# Patient Record
Sex: FEMALE | Race: WHITE | NOT HISPANIC OR LATINO | Employment: FULL TIME | URBAN - METROPOLITAN AREA
[De-identification: names, ages, dates, MRNs, and addresses within clinical notes are randomized per-mention and may not be internally consistent; named-entity substitution may affect disease eponyms.]

---

## 2019-07-29 RX ORDER — FLUTICASONE PROPIONATE 50 MCG
2 SPRAY, SUSPENSION (ML) NASAL DAILY
Qty: 15.8 ML | Refills: 16 | Status: SHIPPED | OUTPATIENT
Start: 2019-07-29

## 2019-07-29 RX ORDER — VENLAFAXINE HYDROCHLORIDE 75 MG/1
75 CAPSULE, EXTENDED RELEASE ORAL DAILY
Qty: 30 CAPSULE | Refills: 1 | Status: SHIPPED | OUTPATIENT
Start: 2019-07-29

## 2019-07-29 RX ORDER — HYDROCHLOROTHIAZIDE 25 MG/1
25 TABLET ORAL DAILY
Qty: 90 TABLET | Refills: 0 | Status: SHIPPED | OUTPATIENT
Start: 2019-07-29

## 2021-08-09 ENCOUNTER — APPOINTMENT (OUTPATIENT)
Dept: GENERAL RADIOLOGY | Facility: HOSPITAL | Age: 56
End: 2021-08-09

## 2021-08-09 ENCOUNTER — APPOINTMENT (OUTPATIENT)
Dept: CT IMAGING | Facility: HOSPITAL | Age: 56
End: 2021-08-09

## 2021-08-09 ENCOUNTER — HOSPITAL ENCOUNTER (INPATIENT)
Facility: HOSPITAL | Age: 56
LOS: 2 days | Discharge: HOME | End: 2021-08-11
Attending: EMERGENCY MEDICINE | Admitting: INTERNAL MEDICINE

## 2021-08-09 DIAGNOSIS — Z99.11 ON MECHANICALLY ASSISTED VENTILATION (HCC): ICD-10-CM

## 2021-08-09 DIAGNOSIS — J96.02 ACUTE RESPIRATORY FAILURE WITH HYPERCAPNIA (HCC): ICD-10-CM

## 2021-08-09 DIAGNOSIS — F10.929 ALCOHOLIC INTOXICATION WITH COMPLICATION (HCC): Primary | ICD-10-CM

## 2021-08-09 LAB
ALBUMIN SERPL-MCNC: 4.7 G/DL (ref 3.5–5.2)
ALBUMIN/GLOB SERPL: 1.2 G/DL
ALP SERPL-CCNC: 111 U/L (ref 39–117)
ALT SERPL W P-5'-P-CCNC: 21 U/L (ref 1–33)
AMPHET+METHAMPHET UR QL: NEGATIVE
ANION GAP SERPL CALCULATED.3IONS-SCNC: 14.5 MMOL/L (ref 5–15)
APAP SERPL-MCNC: <5 MCG/ML (ref 0–30)
ARTERIAL PATENCY WRIST A: POSITIVE
AST SERPL-CCNC: 26 U/L (ref 1–32)
ATMOSPHERIC PRESS: 750.5 MMHG
BARBITURATES UR QL SCN: NEGATIVE
BASE EXCESS BLDA CALC-SCNC: -2.9 MMOL/L (ref 0–2)
BDY SITE: ABNORMAL
BENZODIAZ UR QL SCN: NEGATIVE
BILIRUB SERPL-MCNC: <0.2 MG/DL (ref 0–1.2)
BILIRUB UR QL STRIP: NEGATIVE
BUN SERPL-MCNC: 15 MG/DL (ref 6–20)
BUN/CREAT SERPL: 18.5 (ref 7–25)
CALCIUM SPEC-SCNC: 9.4 MG/DL (ref 8.6–10.5)
CANNABINOIDS SERPL QL: NEGATIVE
CHLORIDE SERPL-SCNC: 100 MMOL/L (ref 98–107)
CLARITY UR: CLEAR
CO2 SERPL-SCNC: 24.5 MMOL/L (ref 22–29)
COCAINE UR QL: NEGATIVE
COLOR UR: YELLOW
CREAT SERPL-MCNC: 0.81 MG/DL (ref 0.57–1)
DEPRECATED RDW RBC AUTO: 43.8 FL (ref 37–54)
EOSINOPHIL # BLD MANUAL: 0.44 10*3/MM3 (ref 0–0.4)
EOSINOPHIL NFR BLD MANUAL: 4 % (ref 0.3–6.2)
ERYTHROCYTE [DISTWIDTH] IN BLOOD BY AUTOMATED COUNT: 13 % (ref 12.3–15.4)
ETHANOL BLD-MCNC: 202 MG/DL (ref 0–10)
ETHANOL UR QL: 0.2 %
GFR SERPL CREATININE-BSD FRML MDRD: 73 ML/MIN/1.73
GFR SERPL CREATININE-BSD FRML MDRD: 89 ML/MIN/1.73
GLOBULIN UR ELPH-MCNC: 3.9 GM/DL
GLUCOSE BLDC GLUCOMTR-MCNC: 103 MG/DL (ref 70–130)
GLUCOSE SERPL-MCNC: 110 MG/DL (ref 65–99)
GLUCOSE UR STRIP-MCNC: NEGATIVE MG/DL
HCO3 BLDA-SCNC: 25.7 MMOL/L (ref 22–28)
HCT VFR BLD AUTO: 38.8 % (ref 34–46.6)
HGB BLD-MCNC: 13.1 G/DL (ref 12–15.9)
HGB UR QL STRIP.AUTO: NEGATIVE
HOLD SPECIMEN: NORMAL
HOLD SPECIMEN: NORMAL
INHALED O2 CONCENTRATION: 60 %
KETONES UR QL STRIP: NEGATIVE
LEUKOCYTE ESTERASE UR QL STRIP.AUTO: NEGATIVE
LYMPHOCYTES # BLD MANUAL: 5.7 10*3/MM3 (ref 0.7–3.1)
LYMPHOCYTES NFR BLD MANUAL: 52 % (ref 19.6–45.3)
LYMPHOCYTES NFR BLD MANUAL: 7 % (ref 5–12)
MAGNESIUM SERPL-MCNC: 2.1 MG/DL (ref 1.6–2.6)
MCH RBC QN AUTO: 31.8 PG (ref 26.6–33)
MCHC RBC AUTO-ENTMCNC: 33.8 G/DL (ref 31.5–35.7)
MCV RBC AUTO: 94.2 FL (ref 79–97)
METHADONE UR QL SCN: NEGATIVE
MODALITY: ABNORMAL
MONOCYTES # BLD AUTO: 0.77 10*3/MM3 (ref 0.1–0.9)
NEUTROPHILS # BLD AUTO: 4.06 10*3/MM3 (ref 1.7–7)
NEUTROPHILS NFR BLD MANUAL: 37 % (ref 42.7–76)
NITRITE UR QL STRIP: NEGATIVE
NRBC BLD AUTO-RTO: 0 /100 WBC (ref 0–0.2)
O2 A-A PPRESDIFF RESPIRATORY: 0.5 MMHG
OPIATES UR QL: NEGATIVE
OXYCODONE UR QL SCN: NEGATIVE
PCO2 BLDA: 61.2 MM HG (ref 35–45)
PEEP RESPIRATORY: 5 CM[H2O]
PH BLDA: 7.23 PH UNITS (ref 7.35–7.45)
PH UR STRIP.AUTO: 5.5 [PH] (ref 5–8)
PLAT MORPH BLD: NORMAL
PLATELET # BLD AUTO: 309 10*3/MM3 (ref 140–450)
PMV BLD AUTO: 11.7 FL (ref 6–12)
PO2 BLDA: 199.9 MM HG (ref 80–100)
POTASSIUM SERPL-SCNC: 3.8 MMOL/L (ref 3.5–5.2)
PROT SERPL-MCNC: 8.6 G/DL (ref 6–8.5)
PROT UR QL STRIP: NEGATIVE
RBC # BLD AUTO: 4.12 10*6/MM3 (ref 3.77–5.28)
RBC MORPH BLD: NORMAL
SALICYLATES SERPL-MCNC: <0.3 MG/DL
SAO2 % BLDCOA: 99.5 % (ref 92–99)
SET MECH RESP RATE: 16
SODIUM SERPL-SCNC: 139 MMOL/L (ref 136–145)
SP GR UR STRIP: 1.01 (ref 1–1.03)
TOTAL RATE: 16 BREATHS/MINUTE
UROBILINOGEN UR QL STRIP: NORMAL
VENTILATOR MODE: ABNORMAL
VT ON VENT VENT: 0.5 ML
WBC # BLD AUTO: 10.97 10*3/MM3 (ref 3.4–10.8)
WBC MORPH BLD: NORMAL
WHOLE BLOOD HOLD SPECIMEN: NORMAL

## 2021-08-09 PROCEDURE — P9612 CATHETERIZE FOR URINE SPEC: HCPCS

## 2021-08-09 PROCEDURE — U0004 COV-19 TEST NON-CDC HGH THRU: HCPCS | Performed by: EMERGENCY MEDICINE

## 2021-08-09 PROCEDURE — 80307 DRUG TEST PRSMV CHEM ANLYZR: CPT | Performed by: EMERGENCY MEDICINE

## 2021-08-09 PROCEDURE — 82803 BLOOD GASES ANY COMBINATION: CPT

## 2021-08-09 PROCEDURE — 70450 CT HEAD/BRAIN W/O DYE: CPT

## 2021-08-09 PROCEDURE — 80179 DRUG ASSAY SALICYLATE: CPT | Performed by: EMERGENCY MEDICINE

## 2021-08-09 PROCEDURE — 5A1935Z RESPIRATORY VENTILATION, LESS THAN 24 CONSECUTIVE HOURS: ICD-10-PCS | Performed by: EMERGENCY MEDICINE

## 2021-08-09 PROCEDURE — 99285 EMERGENCY DEPT VISIT HI MDM: CPT

## 2021-08-09 PROCEDURE — 81003 URINALYSIS AUTO W/O SCOPE: CPT | Performed by: EMERGENCY MEDICINE

## 2021-08-09 PROCEDURE — 94799 UNLISTED PULMONARY SVC/PX: CPT

## 2021-08-09 PROCEDURE — 94002 VENT MGMT INPAT INIT DAY: CPT

## 2021-08-09 PROCEDURE — 25010000002 PROPOFOL 10 MG/ML EMULSION

## 2021-08-09 PROCEDURE — 82962 GLUCOSE BLOOD TEST: CPT

## 2021-08-09 PROCEDURE — 93005 ELECTROCARDIOGRAM TRACING: CPT | Performed by: INTERNAL MEDICINE

## 2021-08-09 PROCEDURE — 71045 X-RAY EXAM CHEST 1 VIEW: CPT

## 2021-08-09 PROCEDURE — 85025 COMPLETE CBC W/AUTO DIFF WBC: CPT | Performed by: EMERGENCY MEDICINE

## 2021-08-09 PROCEDURE — 31500 INSERT EMERGENCY AIRWAY: CPT

## 2021-08-09 PROCEDURE — 85007 BL SMEAR W/DIFF WBC COUNT: CPT | Performed by: EMERGENCY MEDICINE

## 2021-08-09 PROCEDURE — 36600 WITHDRAWAL OF ARTERIAL BLOOD: CPT

## 2021-08-09 PROCEDURE — 84484 ASSAY OF TROPONIN QUANT: CPT | Performed by: INTERNAL MEDICINE

## 2021-08-09 PROCEDURE — 82077 ASSAY SPEC XCP UR&BREATH IA: CPT | Performed by: EMERGENCY MEDICINE

## 2021-08-09 PROCEDURE — 80053 COMPREHEN METABOLIC PANEL: CPT | Performed by: EMERGENCY MEDICINE

## 2021-08-09 PROCEDURE — 83735 ASSAY OF MAGNESIUM: CPT | Performed by: EMERGENCY MEDICINE

## 2021-08-09 PROCEDURE — 80143 DRUG ASSAY ACETAMINOPHEN: CPT | Performed by: EMERGENCY MEDICINE

## 2021-08-09 PROCEDURE — 0BH17EZ INSERTION OF ENDOTRACHEAL AIRWAY INTO TRACHEA, VIA NATURAL OR ARTIFICIAL OPENING: ICD-10-PCS | Performed by: EMERGENCY MEDICINE

## 2021-08-09 PROCEDURE — 25010000002 SUCCINYLCHOLINE PER 20 MG: Performed by: EMERGENCY MEDICINE

## 2021-08-09 PROCEDURE — 25010000002 FENTANYL CITRATE (PF) 50 MCG/ML SOLUTION: Performed by: EMERGENCY MEDICINE

## 2021-08-09 PROCEDURE — 93010 ELECTROCARDIOGRAM REPORT: CPT | Performed by: INTERNAL MEDICINE

## 2021-08-09 RX ORDER — PROPOFOL 10 MG/ML
VIAL (ML) INTRAVENOUS
Status: COMPLETED
Start: 2021-08-09 | End: 2021-08-09

## 2021-08-09 RX ORDER — FENTANYL CITRATE 50 UG/ML
50 INJECTION, SOLUTION INTRAMUSCULAR; INTRAVENOUS ONCE
Status: COMPLETED | OUTPATIENT
Start: 2021-08-09 | End: 2021-08-09

## 2021-08-09 RX ORDER — SODIUM CHLORIDE 9 MG/ML
125 INJECTION, SOLUTION INTRAVENOUS CONTINUOUS
Status: DISCONTINUED | OUTPATIENT
Start: 2021-08-09 | End: 2021-08-11

## 2021-08-09 RX ORDER — SUCCINYLCHOLINE CHLORIDE 20 MG/ML
INJECTION INTRAMUSCULAR; INTRAVENOUS
Status: COMPLETED | OUTPATIENT
Start: 2021-08-09 | End: 2021-08-09

## 2021-08-09 RX ORDER — SODIUM CHLORIDE 0.9 % (FLUSH) 0.9 %
10 SYRINGE (ML) INJECTION AS NEEDED
Status: DISCONTINUED | OUTPATIENT
Start: 2021-08-09 | End: 2021-08-11 | Stop reason: HOSPADM

## 2021-08-09 RX ORDER — SODIUM CHLORIDE, SODIUM LACTATE, POTASSIUM CHLORIDE, CALCIUM CHLORIDE 600; 310; 30; 20 MG/100ML; MG/100ML; MG/100ML; MG/100ML
150 INJECTION, SOLUTION INTRAVENOUS CONTINUOUS
Status: ACTIVE | OUTPATIENT
Start: 2021-08-09 | End: 2021-08-10

## 2021-08-09 RX ORDER — FENTANYL CITRATE 50 UG/ML
100 INJECTION, SOLUTION INTRAMUSCULAR; INTRAVENOUS
Status: DISCONTINUED | OUTPATIENT
Start: 2021-08-09 | End: 2021-08-10

## 2021-08-09 RX ADMIN — FENTANYL CITRATE 50 MCG: 50 INJECTION, SOLUTION INTRAMUSCULAR; INTRAVENOUS at 23:00

## 2021-08-09 RX ADMIN — PROPOFOL 10 MCG/KG/MIN: 10 INJECTION, EMULSION INTRAVENOUS at 21:13

## 2021-08-09 RX ADMIN — SODIUM CHLORIDE 125 ML/HR: 9 INJECTION, SOLUTION INTRAVENOUS at 22:40

## 2021-08-09 RX ADMIN — SODIUM CHLORIDE 1000 ML: 9 INJECTION, SOLUTION INTRAVENOUS at 20:58

## 2021-08-09 RX ADMIN — FENTANYL CITRATE 100 MCG: 50 INJECTION, SOLUTION INTRAMUSCULAR; INTRAVENOUS at 21:21

## 2021-08-09 RX ADMIN — SUCCINYLCHOLINE CHLORIDE 100 MG: 20 INJECTION, SOLUTION INTRAMUSCULAR; INTRAVENOUS; PARENTERAL at 20:59

## 2021-08-09 RX ADMIN — SODIUM CHLORIDE 1000 ML: 9 INJECTION, SOLUTION INTRAVENOUS at 21:04

## 2021-08-10 LAB
ALBUMIN SERPL-MCNC: 3.4 G/DL (ref 3.5–5.2)
ALBUMIN/GLOB SERPL: 1.2 G/DL
ALP SERPL-CCNC: 81 U/L (ref 39–117)
ALT SERPL W P-5'-P-CCNC: 17 U/L (ref 1–33)
ANION GAP SERPL CALCULATED.3IONS-SCNC: 12.4 MMOL/L (ref 5–15)
ARTERIAL PATENCY WRIST A: POSITIVE
ARTERIAL PATENCY WRIST A: POSITIVE
AST SERPL-CCNC: 21 U/L (ref 1–32)
ATMOSPHERIC PRESS: 752.1 MMHG
ATMOSPHERIC PRESS: 752.4 MMHG
BASE EXCESS BLDA CALC-SCNC: -0.7 MMOL/L (ref 0–2)
BASE EXCESS BLDA CALC-SCNC: -2.6 MMOL/L (ref 0–2)
BASOPHILS # BLD AUTO: 0.06 10*3/MM3 (ref 0–0.2)
BASOPHILS NFR BLD AUTO: 0.6 % (ref 0–1.5)
BDY SITE: ABNORMAL
BDY SITE: ABNORMAL
BILIRUB SERPL-MCNC: 0.2 MG/DL (ref 0–1.2)
BUN SERPL-MCNC: 11 MG/DL (ref 6–20)
BUN/CREAT SERPL: 17.5 (ref 7–25)
CALCIUM SPEC-SCNC: 8.3 MG/DL (ref 8.6–10.5)
CHLORIDE SERPL-SCNC: 102 MMOL/L (ref 98–107)
CO2 SERPL-SCNC: 22.6 MMOL/L (ref 22–29)
CREAT SERPL-MCNC: 0.63 MG/DL (ref 0.57–1)
D-LACTATE SERPL-SCNC: 2.3 MMOL/L (ref 0.5–2)
D-LACTATE SERPL-SCNC: 2.9 MMOL/L (ref 0.5–2)
DEPRECATED RDW RBC AUTO: 45.6 FL (ref 37–54)
EOSINOPHIL # BLD AUTO: 0.03 10*3/MM3 (ref 0–0.4)
EOSINOPHIL NFR BLD AUTO: 0.3 % (ref 0.3–6.2)
ERYTHROCYTE [DISTWIDTH] IN BLOOD BY AUTOMATED COUNT: 12.7 % (ref 12.3–15.4)
GFR SERPL CREATININE-BSD FRML MDRD: 98 ML/MIN/1.73
GLOBULIN UR ELPH-MCNC: 2.8 GM/DL
GLUCOSE BLDC GLUCOMTR-MCNC: 115 MG/DL (ref 70–130)
GLUCOSE BLDC GLUCOMTR-MCNC: 94 MG/DL (ref 70–130)
GLUCOSE SERPL-MCNC: 92 MG/DL (ref 65–99)
HCO3 BLDA-SCNC: 23.6 MMOL/L (ref 22–28)
HCO3 BLDA-SCNC: 24.3 MMOL/L (ref 22–28)
HCT VFR BLD AUTO: 35.8 % (ref 34–46.6)
HGB BLD-MCNC: 11.5 G/DL (ref 12–15.9)
IMM GRANULOCYTES # BLD AUTO: 0.04 10*3/MM3 (ref 0–0.05)
IMM GRANULOCYTES NFR BLD AUTO: 0.4 % (ref 0–0.5)
INHALED O2 CONCENTRATION: 25 %
INHALED O2 CONCENTRATION: 25 %
LYMPHOCYTES # BLD AUTO: 2.54 10*3/MM3 (ref 0.7–3.1)
LYMPHOCYTES NFR BLD AUTO: 24.3 % (ref 19.6–45.3)
MCH RBC QN AUTO: 31 PG (ref 26.6–33)
MCHC RBC AUTO-ENTMCNC: 32.1 G/DL (ref 31.5–35.7)
MCV RBC AUTO: 96.5 FL (ref 79–97)
MODALITY: ABNORMAL
MODALITY: ABNORMAL
MONOCYTES # BLD AUTO: 0.76 10*3/MM3 (ref 0.1–0.9)
MONOCYTES NFR BLD AUTO: 7.3 % (ref 5–12)
NEUTROPHILS NFR BLD AUTO: 67.1 % (ref 42.7–76)
NEUTROPHILS NFR BLD AUTO: 7.04 10*3/MM3 (ref 1.7–7)
NRBC BLD AUTO-RTO: 0 /100 WBC (ref 0–0.2)
O2 A-A PPRESDIFF RESPIRATORY: 0.6 MMHG
O2 A-A PPRESDIFF RESPIRATORY: 0.6 MMHG
PCO2 BLDA: 40.3 MM HG (ref 35–45)
PCO2 BLDA: 45.7 MM HG (ref 35–45)
PEEP RESPIRATORY: 5 CM[H2O]
PEEP RESPIRATORY: 5 CM[H2O]
PH BLDA: 7.32 PH UNITS (ref 7.35–7.45)
PH BLDA: 7.39 PH UNITS (ref 7.35–7.45)
PLATELET # BLD AUTO: 260 10*3/MM3 (ref 140–450)
PMV BLD AUTO: 11.9 FL (ref 6–12)
PO2 BLDA: 76.2 MM HG (ref 80–100)
PO2 BLDA: 88.2 MM HG (ref 80–100)
POTASSIUM SERPL-SCNC: 3.3 MMOL/L (ref 3.5–5.2)
PROT SERPL-MCNC: 6.2 G/DL (ref 6–8.5)
PSV: 8 CMH2O
QT INTERVAL: 417 MS
RBC # BLD AUTO: 3.71 10*6/MM3 (ref 3.77–5.28)
SAO2 % BLDCOA: 93.8 % (ref 92–99)
SAO2 % BLDCOA: 96.7 % (ref 92–99)
SARS-COV-2 ORF1AB RESP QL NAA+PROBE: NOT DETECTED
SET MECH RESP RATE: 20
SODIUM SERPL-SCNC: 137 MMOL/L (ref 136–145)
TOTAL RATE: 14 BREATHS/MINUTE
TOTAL RATE: 20 BREATHS/MINUTE
TROPONIN T SERPL-MCNC: <0.01 NG/ML (ref 0–0.03)
VENTILATOR MODE: ABNORMAL
VENTILATOR MODE: ABNORMAL
VT ON VENT VENT: 510 ML
VT ON VENT VENT: 629 ML
WBC # BLD AUTO: 10.47 10*3/MM3 (ref 3.4–10.8)

## 2021-08-10 PROCEDURE — 25010000002 PROPOFOL 10 MG/ML EMULSION: Performed by: INTERNAL MEDICINE

## 2021-08-10 PROCEDURE — 85025 COMPLETE CBC W/AUTO DIFF WBC: CPT | Performed by: INTERNAL MEDICINE

## 2021-08-10 PROCEDURE — 94799 UNLISTED PULMONARY SVC/PX: CPT

## 2021-08-10 PROCEDURE — 82962 GLUCOSE BLOOD TEST: CPT

## 2021-08-10 PROCEDURE — 94003 VENT MGMT INPAT SUBQ DAY: CPT

## 2021-08-10 PROCEDURE — 25010000002 THIAMINE PER 100 MG: Performed by: INTERNAL MEDICINE

## 2021-08-10 PROCEDURE — 83605 ASSAY OF LACTIC ACID: CPT | Performed by: INTERNAL MEDICINE

## 2021-08-10 PROCEDURE — 25010000002 ENOXAPARIN PER 10 MG: Performed by: INTERNAL MEDICINE

## 2021-08-10 PROCEDURE — 25010000002 FENTANYL CITRATE (PF) 50 MCG/ML SOLUTION: Performed by: INTERNAL MEDICINE

## 2021-08-10 PROCEDURE — 80053 COMPREHEN METABOLIC PANEL: CPT | Performed by: INTERNAL MEDICINE

## 2021-08-10 PROCEDURE — 87040 BLOOD CULTURE FOR BACTERIA: CPT | Performed by: INTERNAL MEDICINE

## 2021-08-10 PROCEDURE — 25010000002 PIPERACILLIN SOD-TAZOBACTAM PER 1 G: Performed by: INTERNAL MEDICINE

## 2021-08-10 PROCEDURE — 36600 WITHDRAWAL OF ARTERIAL BLOOD: CPT

## 2021-08-10 PROCEDURE — 82803 BLOOD GASES ANY COMBINATION: CPT

## 2021-08-10 PROCEDURE — 25010000003 POTASSIUM CHLORIDE 10 MEQ/100ML SOLUTION: Performed by: INTERNAL MEDICINE

## 2021-08-10 RX ORDER — POTASSIUM CHLORIDE 750 MG/1
40 TABLET, FILM COATED, EXTENDED RELEASE ORAL AS NEEDED
Status: DISCONTINUED | OUTPATIENT
Start: 2021-08-10 | End: 2021-08-11 | Stop reason: HOSPADM

## 2021-08-10 RX ORDER — ONDANSETRON 2 MG/ML
4 INJECTION INTRAMUSCULAR; INTRAVENOUS EVERY 6 HOURS PRN
Status: DISCONTINUED | OUTPATIENT
Start: 2021-08-10 | End: 2021-08-11 | Stop reason: HOSPADM

## 2021-08-10 RX ORDER — ALUMINA, MAGNESIA, AND SIMETHICONE 2400; 2400; 240 MG/30ML; MG/30ML; MG/30ML
15 SUSPENSION ORAL EVERY 6 HOURS PRN
Status: DISCONTINUED | OUTPATIENT
Start: 2021-08-10 | End: 2021-08-11 | Stop reason: HOSPADM

## 2021-08-10 RX ORDER — ACETAMINOPHEN 650 MG/1
650 SUPPOSITORY RECTAL EVERY 4 HOURS PRN
Status: DISCONTINUED | OUTPATIENT
Start: 2021-08-10 | End: 2021-08-11 | Stop reason: HOSPADM

## 2021-08-10 RX ORDER — SODIUM CHLORIDE 0.9 % (FLUSH) 0.9 %
10 SYRINGE (ML) INJECTION AS NEEDED
Status: DISCONTINUED | OUTPATIENT
Start: 2021-08-10 | End: 2021-08-11 | Stop reason: HOSPADM

## 2021-08-10 RX ORDER — POTASSIUM CHLORIDE 7.45 MG/ML
10 INJECTION INTRAVENOUS
Status: DISCONTINUED | OUTPATIENT
Start: 2021-08-10 | End: 2021-08-11 | Stop reason: HOSPADM

## 2021-08-10 RX ORDER — VENLAFAXINE HYDROCHLORIDE 75 MG/1
75 CAPSULE, EXTENDED RELEASE ORAL DAILY
Status: DISCONTINUED | OUTPATIENT
Start: 2021-08-10 | End: 2021-08-11 | Stop reason: HOSPADM

## 2021-08-10 RX ORDER — POTASSIUM CHLORIDE 1.5 G/1.77G
40 POWDER, FOR SOLUTION ORAL AS NEEDED
Status: DISCONTINUED | OUTPATIENT
Start: 2021-08-10 | End: 2021-08-11 | Stop reason: HOSPADM

## 2021-08-10 RX ORDER — ACETAMINOPHEN 325 MG/1
650 TABLET ORAL EVERY 4 HOURS PRN
Status: DISCONTINUED | OUTPATIENT
Start: 2021-08-10 | End: 2021-08-11 | Stop reason: HOSPADM

## 2021-08-10 RX ORDER — FENTANYL CITRATE 50 UG/ML
50 INJECTION, SOLUTION INTRAMUSCULAR; INTRAVENOUS
Status: DISCONTINUED | OUTPATIENT
Start: 2021-08-10 | End: 2021-08-11

## 2021-08-10 RX ORDER — FENTANYL CITRATE 50 UG/ML
25 INJECTION, SOLUTION INTRAMUSCULAR; INTRAVENOUS EVERY 4 HOURS PRN
Status: DISCONTINUED | OUTPATIENT
Start: 2021-08-10 | End: 2021-08-11

## 2021-08-10 RX ORDER — HYDROCHLOROTHIAZIDE 25 MG/1
25 TABLET ORAL DAILY
Status: DISCONTINUED | OUTPATIENT
Start: 2021-08-10 | End: 2021-08-11 | Stop reason: HOSPADM

## 2021-08-10 RX ORDER — ONDANSETRON 4 MG/1
4 TABLET, FILM COATED ORAL EVERY 6 HOURS PRN
Status: DISCONTINUED | OUTPATIENT
Start: 2021-08-10 | End: 2021-08-11 | Stop reason: HOSPADM

## 2021-08-10 RX ORDER — SODIUM CHLORIDE 0.9 % (FLUSH) 0.9 %
10 SYRINGE (ML) INJECTION EVERY 12 HOURS SCHEDULED
Status: DISCONTINUED | OUTPATIENT
Start: 2021-08-10 | End: 2021-08-11 | Stop reason: HOSPADM

## 2021-08-10 RX ADMIN — TAZOBACTAM SODIUM AND PIPERACILLIN SODIUM 3.38 G: 375; 3 INJECTION, SOLUTION INTRAVENOUS at 16:28

## 2021-08-10 RX ADMIN — TAZOBACTAM SODIUM AND PIPERACILLIN SODIUM 3.38 G: 375; 3 INJECTION, SOLUTION INTRAVENOUS at 02:13

## 2021-08-10 RX ADMIN — SODIUM CHLORIDE, PRESERVATIVE FREE 10 ML: 5 INJECTION INTRAVENOUS at 09:43

## 2021-08-10 RX ADMIN — FENTANYL CITRATE 25 MCG: 50 INJECTION, SOLUTION INTRAMUSCULAR; INTRAVENOUS at 21:02

## 2021-08-10 RX ADMIN — POTASSIUM CHLORIDE 10 MEQ: 7.46 INJECTION, SOLUTION INTRAVENOUS at 11:34

## 2021-08-10 RX ADMIN — PROPOFOL 50 MCG/KG/MIN: 10 INJECTION, EMULSION INTRAVENOUS at 06:07

## 2021-08-10 RX ADMIN — FOLIC ACID 125 ML/HR: 5 INJECTION, SOLUTION INTRAMUSCULAR; INTRAVENOUS; SUBCUTANEOUS at 00:50

## 2021-08-10 RX ADMIN — TAZOBACTAM SODIUM AND PIPERACILLIN SODIUM 3.38 G: 375; 3 INJECTION, SOLUTION INTRAVENOUS at 09:15

## 2021-08-10 RX ADMIN — SODIUM CHLORIDE 125 ML/HR: 9 INJECTION, SOLUTION INTRAVENOUS at 00:04

## 2021-08-10 RX ADMIN — ENOXAPARIN SODIUM 40 MG: 40 INJECTION SUBCUTANEOUS at 06:07

## 2021-08-10 RX ADMIN — SODIUM CHLORIDE 125 ML/HR: 9 INJECTION, SOLUTION INTRAVENOUS at 16:00

## 2021-08-10 RX ADMIN — FENTANYL CITRATE 25 MCG: 50 INJECTION, SOLUTION INTRAMUSCULAR; INTRAVENOUS at 09:52

## 2021-08-10 RX ADMIN — PROPOFOL 50 MCG/KG/MIN: 10 INJECTION, EMULSION INTRAVENOUS at 02:12

## 2021-08-10 RX ADMIN — POTASSIUM CHLORIDE 10 MEQ: 7.46 INJECTION, SOLUTION INTRAVENOUS at 14:14

## 2021-08-10 RX ADMIN — SODIUM CHLORIDE, PRESERVATIVE FREE 10 ML: 5 INJECTION INTRAVENOUS at 21:02

## 2021-08-10 RX ADMIN — SODIUM CHLORIDE, PRESERVATIVE FREE 10 ML: 5 INJECTION INTRAVENOUS at 02:17

## 2021-08-10 RX ADMIN — POTASSIUM CHLORIDE 10 MEQ: 7.46 INJECTION, SOLUTION INTRAVENOUS at 12:47

## 2021-08-10 RX ADMIN — POTASSIUM CHLORIDE 10 MEQ: 7.46 INJECTION, SOLUTION INTRAVENOUS at 09:44

## 2021-08-10 RX ADMIN — SODIUM CHLORIDE, POTASSIUM CHLORIDE, SODIUM LACTATE AND CALCIUM CHLORIDE 150 ML/HR: 600; 310; 30; 20 INJECTION, SOLUTION INTRAVENOUS at 00:08

## 2021-08-10 RX ADMIN — SODIUM CHLORIDE, POTASSIUM CHLORIDE, SODIUM LACTATE AND CALCIUM CHLORIDE 150 ML/HR: 600; 310; 30; 20 INJECTION, SOLUTION INTRAVENOUS at 08:01

## 2021-08-10 NOTE — ED PROVIDER NOTES
EMERGENCY DEPARTMENT ENCOUNTER    Room Number:  13/13  Date of encounter:  8/9/2021  PCP: Provider, No Known  Historian: EMS      HPI:  Chief Complaint: Altered mental status  A complete HPI/ROS/PMH/PSH/SH/FH are unobtainable due to: Altered mental status    Context: Funmi Batres is a 55 y.o. female who presents to the ED via EMS after [place].  The report is that the patient has had excessive alcohol to drink and was essentially unresponsive.  EMS states that in route, she vomited once and has been lethargic and unresponsive to noxious and painful stimuli.  There is no reported trauma.  She is unable to give any history at all.      The patient was placed in a mask in triage, hand hygiene was performed before and after my interaction with the patient.  I wore a mask, safety glasses and gloves during my entire interaction with the patient.    PAST MEDICAL HISTORY  Active Ambulatory Problems     Diagnosis Date Noted   • No Active Ambulatory Problems     Resolved Ambulatory Problems     Diagnosis Date Noted   • No Resolved Ambulatory Problems     Past Medical History:   Diagnosis Date   • Anxiety    • Hypertension          PAST SURGICAL HISTORY  No past surgical history on file.      FAMILY HISTORY  No family history on file.      SOCIAL HISTORY  Social History     Socioeconomic History   • Marital status:      Spouse name: Not on file   • Number of children: Not on file   • Years of education: Not on file   • Highest education level: Not on file         ALLERGIES  Patient has no allergy information on record.        REVIEW OF SYSTEMS  Review of Systems   Unable to perform ROS: Mental status change            PHYSICAL EXAM    I have reviewed the triage vital signs and nursing notes.    ED Triage Vitals [08/09/21 2052]   Temp Heart Rate Resp BP SpO2   -- 74 (!) 6 (!) 114/110 94 %      Temp src Heart Rate Source Patient Position BP Location FiO2 (%)   -- Monitor Lying Left arm --        Physical Exam   Constitutional: Pt. is lethargic.  She does not respond to painful stimuli nor sternal rub.  She does not protect her airway.   HENT: Normocephalic and atraumatic. Oropharynx moist/nonerythematous.  Neck: Normal range of motion. Neck supple. No JVD present.   Cardiovascular: Normal rate, regular rhythm and normal heart sounds. Exam reveals no gallop and no friction rub.   No murmur heard.  Pulmonary/Chest: Effort normal and breath sounds normal. No stridor. No respiratory distress. No wheezes, no rales.   Abdominal: Soft. Bowel sounds are normal. No distension. There are no masses. There is no rebound and no guarding.   Musculoskeletal: No deformities are noted.  He is neurovascular intact in all extremities.  Neurological: Pt. is lethargic and unresponsive.  She has a roving gaze, pupils are equal round and reactive.  Face is symmetric.  Skin: Skin is warm and dry. No rash noted. Pt. is not diaphoretic. No erythema.   Psychiatric: Unable to assess.  Nursing note and vitals reviewed.        LAB RESULTS  Recent Results (from the past 24 hour(s))   Comprehensive Metabolic Panel    Collection Time: 08/09/21  8:58 PM    Specimen: Blood   Result Value Ref Range    Glucose 110 (H) 65 - 99 mg/dL    BUN 15 6 - 20 mg/dL    Creatinine 0.81 0.57 - 1.00 mg/dL    Sodium 139 136 - 145 mmol/L    Potassium 3.8 3.5 - 5.2 mmol/L    Chloride 100 98 - 107 mmol/L    CO2 24.5 22.0 - 29.0 mmol/L    Calcium 9.4 8.6 - 10.5 mg/dL    Total Protein 8.6 (H) 6.0 - 8.5 g/dL    Albumin 4.70 3.50 - 5.20 g/dL    ALT (SGPT) 21 1 - 33 U/L    AST (SGOT) 26 1 - 32 U/L    Alkaline Phosphatase 111 39 - 117 U/L    Total Bilirubin <0.2 0.0 - 1.2 mg/dL    eGFR Non African Amer 73 >60 mL/min/1.73    eGFR  African Amer 89 >60 mL/min/1.73    Globulin 3.9 gm/dL    A/G Ratio 1.2 g/dL    BUN/Creatinine Ratio 18.5 7.0 - 25.0    Anion Gap 14.5 5.0 - 15.0 mmol/L   Ethanol    Collection Time: 08/09/21  8:58 PM    Specimen: Blood   Result Value  Ref Range    Ethanol 202 (H) 0 - 10 mg/dL    Ethanol % 0.202 %   CBC Auto Differential    Collection Time: 08/09/21  8:58 PM    Specimen: Blood   Result Value Ref Range    WBC 10.97 (H) 3.40 - 10.80 10*3/mm3    RBC 4.12 3.77 - 5.28 10*6/mm3    Hemoglobin 13.1 12.0 - 15.9 g/dL    Hematocrit 38.8 34.0 - 46.6 %    MCV 94.2 79.0 - 97.0 fL    MCH 31.8 26.6 - 33.0 pg    MCHC 33.8 31.5 - 35.7 g/dL    RDW 13.0 12.3 - 15.4 %    RDW-SD 43.8 37.0 - 54.0 fl    MPV 11.7 6.0 - 12.0 fL    Platelets 309 140 - 450 10*3/mm3    nRBC 0.0 0.0 - 0.2 /100 WBC   Green Top (Gel)    Collection Time: 08/09/21  8:58 PM   Result Value Ref Range    Extra Tube Hold for add-ons.    Lavender Top    Collection Time: 08/09/21  8:58 PM   Result Value Ref Range    Extra Tube hold for add-on    Gold Top - SST    Collection Time: 08/09/21  8:58 PM   Result Value Ref Range    Extra Tube Hold for add-ons.    Magnesium    Collection Time: 08/09/21  8:58 PM    Specimen: Blood   Result Value Ref Range    Magnesium 2.1 1.6 - 2.6 mg/dL   Salicylate Level    Collection Time: 08/09/21  8:58 PM    Specimen: Blood   Result Value Ref Range    Salicylate <0.3 <=30.0 mg/dL   Acetaminophen Level    Collection Time: 08/09/21  8:58 PM    Specimen: Blood   Result Value Ref Range    Acetaminophen <5.0 0.0 - 30.0 mcg/mL   Manual Differential    Collection Time: 08/09/21  8:58 PM    Specimen: Blood   Result Value Ref Range    Neutrophil % 37.0 (L) 42.7 - 76.0 %    Lymphocyte % 52.0 (H) 19.6 - 45.3 %    Monocyte % 7.0 5.0 - 12.0 %    Eosinophil % 4.0 0.3 - 6.2 %    Neutrophils Absolute 4.06 1.70 - 7.00 10*3/mm3    Lymphocytes Absolute 5.70 (H) 0.70 - 3.10 10*3/mm3    Monocytes Absolute 0.77 0.10 - 0.90 10*3/mm3    Eosinophils Absolute 0.44 (H) 0.00 - 0.40 10*3/mm3    RBC Morphology Normal Normal    WBC Morphology Normal Normal    Platelet Morphology Normal Normal   Blood Gas, Arterial -    Collection Time: 08/09/21  9:36 PM    Specimen: Arterial Blood   Result Value Ref Range     Site Arterial: right radial     Joao's Test Positive     pH, Arterial 7.231 (C) 7.350 - 7.450 pH units    pCO2, Arterial 61.2 (C) 35.0 - 45.0 mm Hg    pO2, Arterial 199.9 (H) 80.0 - 100.0 mm Hg    HCO3, Arterial 25.7 22.0 - 28.0 mmol/L    Base Excess, Arterial -2.9 (L) 0.0 - 2.0 mmol/L    O2 Saturation Calculated 99.5 (H) 92.0 - 99.0 %    A-a Gradiant 0.5 mmHg    Barometric Pressure for Blood Gas 750.5 mmHg    Modality Adult Vent     FIO2 60 %    Ventilator Mode VC     Set Tidal Volume 0.500     Set Mech Resp Rate 16     Rate 16 Breaths/minute    PEEP 5    Urine Drug Screen - Urine, Clean Catch    Collection Time: 08/09/21  9:54 PM    Specimen: Urine, Clean Catch   Result Value Ref Range    Amphet/Methamphet, Screen Negative Negative    Barbiturates Screen, Urine Negative Negative    Benzodiazepine Screen, Urine Negative Negative    Cocaine Screen, Urine Negative Negative    Opiate Screen Negative Negative    THC, Screen, Urine Negative Negative    Methadone Screen, Urine Negative Negative    Oxycodone Screen, Urine Negative Negative   Urinalysis With Microscopic If Indicated (No Culture) - Urine, Catheter    Collection Time: 08/09/21  9:54 PM    Specimen: Urine, Catheter   Result Value Ref Range    Color, UA Yellow Yellow, Straw    Appearance, UA Clear Clear    pH, UA 5.5 5.0 - 8.0    Specific Gravity, UA 1.015 1.005 - 1.030    Glucose, UA Negative Negative    Ketones, UA Negative Negative    Bilirubin, UA Negative Negative    Blood, UA Negative Negative    Protein, UA Negative Negative    Leuk Esterase, UA Negative Negative    Nitrite, UA Negative Negative    Urobilinogen, UA 0.2 E.U./dL 0.2 - 1.0 E.U./dL       Ordered the above labs and independently reviewed the results.        RADIOLOGY  XR Chest 1 View    Result Date: 8/9/2021  SINGLE VIEW OF THE CHEST  HISTORY: Tube placement  COMPARISON: None available.  FINDINGS: Endotracheal tube terminates at the thoracic inlet. It could be advanced approximately to 3  cm. There is cardiomegaly. There is vascular congestion. No pneumothorax is seen. There is left basilar consolidation, which may reflect atelectasis or infiltrate, and there may be a small left pleural effusion. No pneumothorax is seen.      Endotracheal tube terminates at the thoracic inlet. It could be advanced approximately 2 to 3 cm.  This report was finalized on 8/9/2021 9:33 PM by Dr. Kenya Black M.D.        I ordered the above noted radiological studies. Reviewed by me and discussed with radiologist.  See dictation for official radiology interpretation.      PROCEDURES    Intubation    Date/Time: 8/9/2021 9:05 PM  Performed by: Keith Chris MD  Authorized by: Keith Chris MD     Consent:     Consent obtained:  Emergent situation  Pre-procedure details:     Patient status:  Unresponsive    Paralytics:  Succinylcholine  Procedure details:     Preoxygenation:  Nonrebreather mask    CPR in progress: no      Intubation method:  Oral    Oral intubation technique:  Video-assisted    Tube size (mm):  7.5    Tube type:  Cuffed    Number of attempts:  1    Cricoid pressure: yes      Tube visualized through cords: yes    Placement assessment:     ETT to teeth:  22 cm    Tube secured with:  ETT oliveira    Breath sounds:  Equal    Placement verification: chest rise, condensation, CXR verification, direct visualization and equal breath sounds    Post-procedure details:     Patient tolerance of procedure:  Tolerated well, no immediate complications    Critical Care  Performed by: Keith Chris MD  Authorized by: Keith Chris MD     Critical care provider statement:     Critical care time (minutes):  35    Critical care time was exclusive of:  Separately billable procedures and treating other patients    Critical care was necessary to treat or prevent imminent or life-threatening deterioration of the following conditions:  CNS failure or compromise and respiratory failure    Critical care was time  spent personally by me on the following activities:  Discussions with consultants, evaluation of patient's response to treatment, examination of patient, obtaining history from patient or surrogate, ventilator management, review of old charts, re-evaluation of patient's condition, pulse oximetry, ordering and review of radiographic studies, ordering and review of laboratory studies and ordering and performing treatments and interventions    I assumed direction of critical care for this patient from another provider in my specialty: no            MEDICATIONS GIVEN IN ER    Medications   sodium chloride 0.9 % flush 10 mL (has no administration in time range)   sodium chloride 0.9 % infusion (125 mL/hr Intravenous New Bag 8/9/21 2240)   propofol (DIPRIVAN) infusion 10 mg/mL 100 mL (30 mcg/kg/min × 77.1 kg Intravenous Rate/Dose Change 8/9/21 2235)   fentaNYL citrate (PF) (SUBLIMAZE) injection 100 mcg (100 mcg Intravenous Given 8/9/21 2121)   fentaNYL citrate (PF) (SUBLIMAZE) injection 50 mcg (has no administration in time range)   sodium chloride 0.9 % bolus 1,000 mL (0 mL Intravenous Stopped 8/9/21 2207)   succinylcholine (ANECTINE) injection (100 mg Intravenous Given 8/9/21 2059)   sodium chloride 0.9 % bolus 1,000 mL (0 mL Intravenous Stopped 8/9/21 2207)         PROGRESS, DATA ANALYSIS, CONSULTS, AND MEDICAL DECISION MAKING    Any/all labs have been independently reviewed by me.  Any/all radiology studies have been reviewed by me and discussed with radiologist dictating the report.   EKG's independently viewed and interpreted by me.  Discussion below represents my analysis of pertinent findings related to patient's condition, differential diagnosis, treatment plan and final disposition.      ED Course as of Aug 09 2257   Mon Aug 09, 2021   2116 Patient biting on tube and attempting to extubate herself.  She does not follow commands, soft restraints will be applied.  Small dose of fentanyl has been  ordered.    Differential diagnosis includes but is not limited to: Acute intoxication, hypoglycemia, hyperglycemia/DKA, intentional/accidental overdose, head trauma/intracranial hemorrhage/CVA/intracranial mass/cerebral edema, electrolyte abnormality, sepsis, psychiatric disorder etc.    [WC]   2131 CBC is unremarkable.    [WC]   2131 This x-ray interpreted by me shows ET tube in good position.    [WC]   2136 Elevated   Ethanol(!): 202 [WC]   2136 Normal   Acetaminophen: <5.0 [WC]   2136 Normal   Salicylate: <0.3 [WC]   2136 Normal   Magnesium: 2.1 [WC]   2137 ABG shows pH of 7.2 and a PCO2 of 61.  Ventilator rate increased to 20 breaths/min.    [WC]   2157 CMP is unremarkable.    [WC]   2219 Additional history was obtained from the patient's .  He was not [place] the country [place] with her tonight, but he did pick her up.  He states she vomited in his car and was unresponsive, so he summoned EMS.  He states that it is very unusual for her to drink this much.    [WC]   2220 Patient being transported to CT at this time.    [WC]   2224 Urine toxicology is negative.    [WC]   2231 Case discussed with Dr. Evelio Newby (pulmonary critical care medicine)-he accepted patient for admission to the ICU.    [WC]   2231 My initial interpretation of the head CT shows no acute hemorrhage, mass-effect or edema. Awaiting official radiology read.    [WC]   2250 Dr. Newby down to see patient.    [WC]   2257 CT of the brain was independently viewed by me and discussed with Dr. Black (radiology)-there are no acute processes identified.  For official interpretation, see dictated report.    [WC]      ED Course User Index  [WC] Keith Chris MD       AS OF 22:57 EDT VITALS:    BP - 140/73  HR - 76  TEMP - 97.4 °F (36.3 °C) (Tympanic)  02 SATS - 100%        DIAGNOSIS  Final diagnoses:   Alcoholic intoxication with complication (CMS/HCC)   Acute respiratory failure with hypercapnia (CMS/HCC)   On mechanically assisted  ventilation (CMS/[facility])         DISPOSITION  Admitted-ICU           Keith Chris MD  08/09/21 8552

## 2021-08-10 NOTE — PLAN OF CARE
Goal Outcome Evaluation:  Plan of Care Reviewed With: patient  Progress: improving    Patient remains in CCU extubated on 2L NC. VSS. Afebrile. Great U.O. Potassium replaced. No significant changes.

## 2021-08-10 NOTE — CONSULTS
.     Admission Note    Patient Identification:  Funmi Batres  55 y.o.  female  1965  [id number]            CC: altered mental status    History of Present Illness:  Patient is a 55-year-old female  With a previous medical history of allergic rhinitis and hypertension who presented to the emergency room by EMS secondary to altered mental status and respiratory distress.  The patient's  relates history that the patient had been in her normal state of health.  Had gone to a business lunch/dinner celebration.  He subsequently got a call that she would need a ride home secondary to alcohol consumption.  Upon his arrival the patient was able to walk with significant assistance drowsy but responsive.  By the time she got to her 's car she had a episode of emesis passed out but was able to awaken to 's voice however patient lost consciousness and emergency medical services were called.    Upon arrival the patient was not protecting her airway was intubated by ER staff in the ER.  abg showing acute hypercapnic resp failure. CT scan of the head showed no acute abnormality.  Laboratory investigations were reassuring EtOH level 202.  The patient's  however relates that she usually does not drink very much at all.  On social occasion she will have maybe 1 drink at the max.  That this amount of alcohol for her would be significant.    RN reports pt awakened during ct head and nodded and shook head to command.  Briefly, during my interview, pt did awaken nodded head that she was cold.    Previous medical history  Allergic rhinitis  Hypertension    Surgical history unable to obtain    Social history  Lives in [facility] she is        Fam hx non contrib     Allergies unable to verify    Review of Systems:  Unable to obtain accurate ROS as patient is unable to answer questions due to intubated.    Physical Exam:  Vitals:  Vitals:    08/09/21 2141 08/09/21 2156 08/09/21 2157 08/09/21  2210   BP: 123/74 142/79     BP Location:       Patient Position:       Pulse: 68 77 77 77   Resp:       Temp:       TempSrc:       SpO2: 100% 100% 100% 100%   Weight:         FiO2 (%): 60 %     There is no height or weight on file to calculate BMI.  No intake or output data in the 24 hours ending 08/09/21 2231    Vent Settings          Resp Rate (Set): 16     FiO2 (%): 60 %  PEEP/CPAP (cm H2O): 5 cm H20    Minute Ventilation (L/min) (Obs): 8.01 L/min  Resp Rate (Observed) Vent: 16     I:E Ratio (Obs): 1:3.2    PIP Observed (cm H2O): 23 cm H2O         Exam:  General appearance: on vent, will awaken nods head yes or no   Eyes: anicteric sclerae, moist conjunctivae; PERRLA  HENT: Atraumatic; +ET tube  Neck: Trachea midline; FROM   Lungs: mech air entry B CTA, with normal respiratory effort and no intercostal retractions  CV: RRR, no MRGs   Abdomen: Soft, non-tender; no masses or HSM  Extremities: No peripheral edema or extremity lymphadenopathy  Skin: wwp no diffuse rash  Psych/Neuro- will awaken, nods and shakes head to questions, moves exts  Scheduled meds:       Data Review:   I reviewed the patient's medications and new clinical results.  Lab Results   Component Value Date    CALCIUM 9.4 08/09/2021     Results from last 7 days   Lab Units 08/09/21 2058   AST (SGOT) U/L 26   MAGNESIUM mg/dL 2.1   SODIUM mmol/L 139   POTASSIUM mmol/L 3.8   CHLORIDE mmol/L 100   CO2 mmol/L 24.5   BUN mg/dL 15   CREATININE mg/dL 0.81   GLUCOSE mg/dL 110*   CALCIUM mg/dL 9.4   WBC 10*3/mm3 10.97*   HEMOGLOBIN g/dL 13.1   PLATELETS 10*3/mm3 309   ALT (SGPT) U/L 21         Results from last 7 days   Lab Units 08/09/21 2136   PH, ARTERIAL pH units 7.231*   PO2 ART mm Hg 199.9*   PCO2, ARTERIAL mm Hg 61.2*   HCO3 ART mmol/L 25.7     CrCl cannot be calculated (Unknown ideal weight.).    IMAGING:  I have reviewed all imaging studies since my last documentation.  Imaging Results (Most Recent)     Procedure Component Value Units Date/Time     CT Head Without Contrast [[id number]] Resulted: 08/09/21 2226     Updated: 08/09/21 2228    XR Chest 1 View [[id number]] Collected: 08/09/21 2131     Updated: 08/09/21 2136    Narrative:      SINGLE VIEW OF THE CHEST     HISTORY: Tube placement     COMPARISON: None available.     FINDINGS:  Endotracheal tube terminates at the thoracic inlet. It could be advanced  approximately to 3 cm. There is cardiomegaly. There is vascular  congestion. No pneumothorax is seen. There is left basilar  consolidation, which may reflect atelectasis or infiltrate, and there  may be a small left pleural effusion. No pneumothorax is seen.       Impression:      Endotracheal tube terminates at the thoracic inlet. It could be advanced  approximately 2 to 3 cm.     This report was finalized on 8/9/2021 9:33 PM by Dr. Kenya Black M.D.             ASSESSMENT /   PLAN:  Acute hypercapnic resp failure  Airway compromise  ETOH intoxication    Vent adjusted, to pc, ventilation improved, synchrony improved, fio2 decreased  Repeat abg  Propfol, fentanyl  ivfs  Thiamine  Await official ct head report  Cover with zosyn given inability to protect airway and emesis history.  May be able to stop this pending clinical course.  Levels not alarmingly high, however patient reports patient does not regularly drink.  Utox negative, lab investigations otherwise reassuring outside of etoh and hypercapnia.  Will send trop get ecg   Discussed plan of care with pts  at bedside.    Total critical care time was 40 minutes, excluding any separately billable procedure time.    Evelio Newby MD  [facility]  08/09/21  22:31 EDT

## 2021-08-10 NOTE — PAYOR COMM NOTE
"Funmi Batres (55 y.o. Female)                                 ATTENTION;   AUTH PENDING    [id number]                                     FOR NURSE REVIEW ,   REPLY TO [facility] DEPT  [phone] OR                                  CALL          Date of Birth Social Security Number Address Home Phone MRN    1965  [id number] [facility]  [place] [facility] [address] [phone] [id number]    [place] Marital Status          Unknown        Admission Date Admission Type Admitting Provider Attending Provider Department, Room/Bed    8/9/21 Emergency Evelio Newby MD Kellie, Brandon John, MD [facility], N336/1    Discharge Date Discharge Disposition Discharge Destination                       Attending Provider: Evelio Newby MD    Allergies: Eggs Or Egg-derived Products    Isolation: None   Infection: None   Code Status: CPR    Ht: 165 cm (64.96\")   Wt: 87.4 kg (192 lb 10.9 oz)    Admission Cmt: None   Principal Problem: None                Active Insurance as of 8/9/2021     Primary Coverage     Payor Plan Insurance Group Employer/Plan Group    [facility] COMMUNITY PLAN [facility] PLAN [facility]      Payor Plan Address Payor Plan Phone Number Payor Plan Fax Number Effective Dates    [address]   3/1/2020 - None Entered    [facility] [id number]       Subscriber Name Subscriber Birth Date Member ID       FUNMI BATRES 1965 [id number]                 Emergency Contacts      (Rel.) Home Phone Work Phone Mobile Phone    kilo Batres (Spouse) -- -- [phone]               History & Physical      Evelio Newby MD at 08/10/21 0110            .      Admission Note     Patient Identification:  Funmi Batres  55 y.o.  female  1965  [id number]             CC: altered mental status     History of Present Illness:  Patient is a 55-year-old female  With a previous medical history of allergic rhinitis and hypertension who presented to the " emergency room by EMS secondary to altered mental status and respiratory distress.  The patient's  relates history that the patient had been in her normal state of health.  Had gone to a business lunch/dinner celebration.  He subsequently got a call that she would need a ride home secondary to alcohol consumption.  Upon his arrival the patient was able to walk with significant assistance drowsy but responsive.  By the time she got to her 's car she had a episode of emesis passed out but was able to awaken to 's voice however patient lost consciousness and emergency medical services were called.     Upon arrival the patient was not protecting her airway was intubated by ER staff in the ER.  abg showing acute hypercapnic resp failure. CT scan of the head showed no acute abnormality.  Laboratory investigations were reassuring EtOH level 202.  The patient's  however relates that she usually does not drink very much at all.  On social occasion she will have maybe 1 drink at the max.  That this amount of alcohol for her would be significant.     RN reports pt awakened during ct head and nodded and shook head to command.  Briefly, during my interview, pt did awaken nodded head that she was cold.     Previous medical history  Allergic rhinitis  Hypertension     Surgical history unable to obtain     Social history  Lives in [facility] she is         Fam hx non contrib      Allergies unable to verify     Review of Systems:  Unable to obtain accurate ROS as patient is unable to answer questions due to intubated.     Physical Exam:  Vitals:  Vitals          Vitals:     08/09/21 2141 08/09/21 2156 08/09/21 2157 08/09/21 2210   BP: 123/74 142/79       BP Location:           Patient Position:           Pulse: 68 77 77 77   Resp:           Temp:           TempSrc:           SpO2: 100% 100% 100% 100%   Weight:                 FiO2 (%): 60 %                                     There is no  height or weight on file to calculate BMI.  No intake or output data in the 24 hours ending 08/09/21 2231     Vent Settings     Resp Rate (Set): 16  FiO2 (%): 60 %  PEEP/CPAP (cm H2O): 5 cm H20     Minute Ventilation (L/min) (Obs): 8.01 L/min  Resp Rate (Observed) Vent: 16  I:E Ratio (Obs): 1:3.2     PIP Observed (cm H2O): 23 cm H2O        Exam:  General appearance: on vent, will awaken nods head yes or no   Eyes: anicteric sclerae, moist conjunctivae; PERRLA  HENT: Atraumatic; +ET tube  Neck: Trachea midline; FROM   Lungs: mech air entry B CTA, with normal respiratory effort and no intercostal retractions  CV: RRR, no MRGs   Abdomen: Soft, non-tender; no masses or HSM  Extremities: No peripheral edema or extremity lymphadenopathy  Skin: wwp no diffuse rash  Psych/Neuro- will awaken, nods and shakes head to questions, moves exts  Scheduled meds:             Data Review:   I reviewed the patient's medications and new clinical results.        Lab Results   Component Value Date     CALCIUM 9.4 08/09/2021           Results from last 7 days   Lab Units 08/09/21 2058   AST (SGOT) U/L 26   MAGNESIUM mg/dL 2.1   SODIUM mmol/L 139   POTASSIUM mmol/L 3.8   CHLORIDE mmol/L 100   CO2 mmol/L 24.5   BUN mg/dL 15   CREATININE mg/dL 0.81   GLUCOSE mg/dL 110*   CALCIUM mg/dL 9.4   WBC 10*3/mm3 10.97*   HEMOGLOBIN g/dL 13.1   PLATELETS 10*3/mm3 309   ALT (SGPT) U/L 21               Results from last 7 days   Lab Units 08/09/21 2136   PH, ARTERIAL pH units 7.231*   PO2 ART mm Hg 199.9*   PCO2, ARTERIAL mm Hg 61.2*   HCO3 ART mmol/L 25.7      CrCl cannot be calculated (Unknown ideal weight.).     IMAGING:  I have reviewed all imaging studies since my last documentation.           Imaging Results (Most Recent)      Procedure Component Value Units Date/Time     CT Head Without Contrast [[id number]] Resulted: 08/09/21 2226       Updated: 08/09/21 2228     XR Chest 1 View [[id number]] Collected: 08/09/21 2131       Updated: 08/09/21 2136  "    Narrative:       SINGLE VIEW OF THE CHEST     HISTORY: Tube placement     COMPARISON: None available.     FINDINGS:  Endotracheal tube terminates at the thoracic inlet. It could be advanced  approximately to 3 cm. There is cardiomegaly. There is vascular  congestion. No pneumothorax is seen. There is left basilar  consolidation, which may reflect atelectasis or infiltrate, and there  may be a small left pleural effusion. No pneumothorax is seen.        Impression:       Endotracheal tube terminates at the thoracic inlet. It could be advanced  approximately 2 to 3 cm.     This report was finalized on 8/9/2021 9:33 PM by Dr. Kenya Black M.D.                ASSESSMENT /   PLAN:  Acute hypercapnic resp failure  Airway compromise  ETOH intoxication     Vent adjusted, to pc, ventilation improved, synchrony improved, fio2 decreased  Repeat abg  Propfol, fentanyl  ivfs  Thiamine  Await official ct head report  Cover with zosyn given inability to protect airway and emesis history.  May be able to stop this pending clinical course.  Levels not alarmingly high, however patient reports patient does not regularly drink.  Utox negative, lab investigations otherwise reassuring outside of etoh and hypercapnia.  Will send trop get ecg   Discussed plan of care with pts  at bedside.     Total critical care time was 40 minutes, excluding any separately billable procedure time.     Evelio Newby MD  [facility]  08/09/21  22:31 EDT                 **please note that this is entered incorrectly as a \"consult note\" tab in epic, copied and pasted into this \"H and P note\" for hospital chart completion purposes.**    Electronically signed by Evelio Newby MD at 08/10/21 0111       "

## 2021-08-10 NOTE — H&P
.      Admission Note     Patient Identification:  Funmi Batres  55 y.o.  female  1965  [id number]             CC: altered mental status     History of Present Illness:  Patient is a 55-year-old female  With a previous medical history of allergic rhinitis and hypertension who presented to the emergency room by EMS secondary to altered mental status and respiratory distress.  The patient's  relates history that the patient had been in her normal state of health.  Had gone to a business lunch/dinner celebration.  He subsequently got a call that she would need a ride home secondary to alcohol consumption.  Upon his arrival the patient was able to walk with significant assistance drowsy but responsive.  By the time she got to her 's car she had a episode of emesis passed out but was able to awaken to 's voice however patient lost consciousness and emergency medical services were called.     Upon arrival the patient was not protecting her airway was intubated by ER staff in the ER.  abg showing acute hypercapnic resp failure. CT scan of the head showed no acute abnormality.  Laboratory investigations were reassuring EtOH level 202.  The patient's  however relates that she usually does not drink very much at all.  On social occasion she will have maybe 1 drink at the max.  That this amount of alcohol for her would be significant.     RN reports pt awakened during ct head and nodded and shook head to command.  Briefly, during my interview, pt did awaken nodded head that she was cold.     Previous medical history  Allergic rhinitis  Hypertension     Surgical history unable to obtain     Social history  Lives in [facility] she is         Fam hx non contrib      Allergies unable to verify     Review of Systems:  Unable to obtain accurate ROS as patient is unable to answer questions due to intubated.     Physical Exam:  Vitals:  Vitals          Vitals:     08/09/21 2141 08/09/21  2156 08/09/21 2157 08/09/21 2210   BP: 123/74 142/79       BP Location:           Patient Position:           Pulse: 68 77 77 77   Resp:           Temp:           TempSrc:           SpO2: 100% 100% 100% 100%   Weight:                 FiO2 (%): 60 %                                     There is no height or weight on file to calculate BMI.  No intake or output data in the 24 hours ending 08/09/21 2231     Vent Settings     Resp Rate (Set): 16  FiO2 (%): 60 %  PEEP/CPAP (cm H2O): 5 cm H20     Minute Ventilation (L/min) (Obs): 8.01 L/min  Resp Rate (Observed) Vent: 16  I:E Ratio (Obs): 1:3.2     PIP Observed (cm H2O): 23 cm H2O        Exam:  General appearance: on vent, will awaken nods head yes or no   Eyes: anicteric sclerae, moist conjunctivae; PERRLA  HENT: Atraumatic; +ET tube  Neck: Trachea midline; FROM   Lungs: mech air entry B CTA, with normal respiratory effort and no intercostal retractions  CV: RRR, no MRGs   Abdomen: Soft, non-tender; no masses or HSM  Extremities: No peripheral edema or extremity lymphadenopathy  Skin: wwp no diffuse rash  Psych/Neuro- will awaken, nods and shakes head to questions, moves exts  Scheduled meds:             Data Review:   I reviewed the patient's medications and new clinical results.        Lab Results   Component Value Date     CALCIUM 9.4 08/09/2021           Results from last 7 days   Lab Units 08/09/21 2058   AST (SGOT) U/L 26   MAGNESIUM mg/dL 2.1   SODIUM mmol/L 139   POTASSIUM mmol/L 3.8   CHLORIDE mmol/L 100   CO2 mmol/L 24.5   BUN mg/dL 15   CREATININE mg/dL 0.81   GLUCOSE mg/dL 110*   CALCIUM mg/dL 9.4   WBC 10*3/mm3 10.97*   HEMOGLOBIN g/dL 13.1   PLATELETS 10*3/mm3 309   ALT (SGPT) U/L 21               Results from last 7 days   Lab Units 08/09/21 2136   PH, ARTERIAL pH units 7.231*   PO2 ART mm Hg 199.9*   PCO2, ARTERIAL mm Hg 61.2*   HCO3 ART mmol/L 25.7      CrCl cannot be calculated (Unknown ideal weight.).     IMAGING:  I have reviewed all imaging studies  "since my last documentation.           Imaging Results (Most Recent)      Procedure Component Value Units Date/Time     CT Head Without Contrast [[id number]] Resulted: 08/09/21 2226       Updated: 08/09/21 2228     XR Chest 1 View [[id number]] Collected: 08/09/21 2131       Updated: 08/09/21 2136     Narrative:       SINGLE VIEW OF THE CHEST     HISTORY: Tube placement     COMPARISON: None available.     FINDINGS:  Endotracheal tube terminates at the thoracic inlet. It could be advanced  approximately to 3 cm. There is cardiomegaly. There is vascular  congestion. No pneumothorax is seen. There is left basilar  consolidation, which may reflect atelectasis or infiltrate, and there  may be a small left pleural effusion. No pneumothorax is seen.        Impression:       Endotracheal tube terminates at the thoracic inlet. It could be advanced  approximately 2 to 3 cm.     This report was finalized on 8/9/2021 9:33 PM by Dr. Kenya Black M.D.                ASSESSMENT /   PLAN:  Acute hypercapnic resp failure  Airway compromise  ETOH intoxication     Vent adjusted, to pc, ventilation improved, synchrony improved, fio2 decreased  Repeat abg  Propfol, fentanyl  ivfs  Thiamine  Await official ct head report  Cover with zosyn given inability to protect airway and emesis history.  May be able to stop this pending clinical course.  Levels not alarmingly high, however patient reports patient does not regularly drink.  Utox negative, lab investigations otherwise reassuring outside of etoh and hypercapnia.  Will send trop get ecg   Discussed plan of care with pts  at bedside.     Total critical care time was 40 minutes, excluding any separately billable procedure time.     Evelio Newby MD  [facility]  08/09/21  22:31 EDT                 **please note that this is entered incorrectly as a \"consult note\" tab in epic, copied and pasted into this \"H and P note\" for hospital chart completion " purposes.**

## 2021-08-10 NOTE — PLAN OF CARE
Goal Outcome Evaluation:   New admission patient intubated on propofol gtt but wakes easy, follow commands VSS plan to extubate this am

## 2021-08-10 NOTE — ED NOTES
Pt arrived by EMS, arrived for unresponsive, pt non responsive to painful stimuli. EMS reports pt walked to truck, and became unresponsive. Pt ETOH. Family denies drug use.     Patient was placed in face mask during first look triage.  Patient was wearing a face mask throughout encounter.  I wore personal protective equipment throughout the encounter.  Hand hygiene was performed before and after patient encounter.        Zonia Stover, EBONY  08/09/21 2051

## 2021-08-10 NOTE — ED NOTES
Patient was placed in face mask in first look. Patient was wearing facemask when I entered the room and throughout our encounter. I wore full protective equipment throughout this patient encounter including a face mask, eye shield, gown, and gloves. Hand hygiene was performed before donning protective equipment and after removal when leaving the room.     Gladys Loyola RN  08/09/21 4736

## 2021-08-11 VITALS
SYSTOLIC BLOOD PRESSURE: 131 MMHG | WEIGHT: 200.62 LBS | HEART RATE: 75 BPM | TEMPERATURE: 98.5 F | HEIGHT: 65 IN | DIASTOLIC BLOOD PRESSURE: 74 MMHG | BODY MASS INDEX: 33.43 KG/M2 | OXYGEN SATURATION: 94 % | RESPIRATION RATE: 16 BRPM

## 2021-08-11 LAB
ALBUMIN SERPL-MCNC: 3.2 G/DL (ref 3.5–5.2)
ALBUMIN/GLOB SERPL: 1.2 G/DL
ALP SERPL-CCNC: 75 U/L (ref 39–117)
ALT SERPL W P-5'-P-CCNC: 15 U/L (ref 1–33)
ANION GAP SERPL CALCULATED.3IONS-SCNC: 8.6 MMOL/L (ref 5–15)
AST SERPL-CCNC: 18 U/L (ref 1–32)
BASOPHILS # BLD AUTO: 0.07 10*3/MM3 (ref 0–0.2)
BASOPHILS NFR BLD AUTO: 1.2 % (ref 0–1.5)
BILIRUB SERPL-MCNC: 0.5 MG/DL (ref 0–1.2)
BUN SERPL-MCNC: 8 MG/DL (ref 6–20)
BUN/CREAT SERPL: 10.1 (ref 7–25)
CALCIUM SPEC-SCNC: 8.4 MG/DL (ref 8.6–10.5)
CHLORIDE SERPL-SCNC: 108 MMOL/L (ref 98–107)
CO2 SERPL-SCNC: 25.4 MMOL/L (ref 22–29)
CREAT SERPL-MCNC: 0.79 MG/DL (ref 0.57–1)
DEPRECATED RDW RBC AUTO: 46.2 FL (ref 37–54)
EOSINOPHIL # BLD AUTO: 0.14 10*3/MM3 (ref 0–0.4)
EOSINOPHIL NFR BLD AUTO: 2.3 % (ref 0.3–6.2)
ERYTHROCYTE [DISTWIDTH] IN BLOOD BY AUTOMATED COUNT: 12.9 % (ref 12.3–15.4)
GFR SERPL CREATININE-BSD FRML MDRD: 75 ML/MIN/1.73
GLOBULIN UR ELPH-MCNC: 2.6 GM/DL
GLUCOSE SERPL-MCNC: 88 MG/DL (ref 65–99)
HCT VFR BLD AUTO: 34.9 % (ref 34–46.6)
HGB BLD-MCNC: 11 G/DL (ref 12–15.9)
IMM GRANULOCYTES # BLD AUTO: 0.01 10*3/MM3 (ref 0–0.05)
IMM GRANULOCYTES NFR BLD AUTO: 0.2 % (ref 0–0.5)
LYMPHOCYTES # BLD AUTO: 2.39 10*3/MM3 (ref 0.7–3.1)
LYMPHOCYTES NFR BLD AUTO: 39.5 % (ref 19.6–45.3)
MCH RBC QN AUTO: 30.5 PG (ref 26.6–33)
MCHC RBC AUTO-ENTMCNC: 31.5 G/DL (ref 31.5–35.7)
MCV RBC AUTO: 96.7 FL (ref 79–97)
MONOCYTES # BLD AUTO: 0.53 10*3/MM3 (ref 0.1–0.9)
MONOCYTES NFR BLD AUTO: 8.8 % (ref 5–12)
NEUTROPHILS NFR BLD AUTO: 2.91 10*3/MM3 (ref 1.7–7)
NEUTROPHILS NFR BLD AUTO: 48 % (ref 42.7–76)
NRBC BLD AUTO-RTO: 0 /100 WBC (ref 0–0.2)
PLATELET # BLD AUTO: 239 10*3/MM3 (ref 140–450)
PMV BLD AUTO: 11.8 FL (ref 6–12)
POTASSIUM SERPL-SCNC: 3.6 MMOL/L (ref 3.5–5.2)
PROT SERPL-MCNC: 5.8 G/DL (ref 6–8.5)
RBC # BLD AUTO: 3.61 10*6/MM3 (ref 3.77–5.28)
SODIUM SERPL-SCNC: 142 MMOL/L (ref 136–145)
WBC # BLD AUTO: 6.05 10*3/MM3 (ref 3.4–10.8)

## 2021-08-11 PROCEDURE — 80053 COMPREHEN METABOLIC PANEL: CPT | Performed by: INTERNAL MEDICINE

## 2021-08-11 PROCEDURE — 25010000002 ENOXAPARIN PER 10 MG: Performed by: INTERNAL MEDICINE

## 2021-08-11 PROCEDURE — 85025 COMPLETE CBC W/AUTO DIFF WBC: CPT | Performed by: INTERNAL MEDICINE

## 2021-08-11 PROCEDURE — 25010000002 PIPERACILLIN SOD-TAZOBACTAM PER 1 G: Performed by: INTERNAL MEDICINE

## 2021-08-11 RX ORDER — AMOXICILLIN AND CLAVULANATE POTASSIUM 875; 125 MG/1; MG/1
1 TABLET, FILM COATED ORAL 2 TIMES DAILY
Qty: 20 TABLET | Refills: 0 | Status: SHIPPED | OUTPATIENT
Start: 2021-08-11 | End: 2021-08-21

## 2021-08-11 RX ADMIN — TAZOBACTAM SODIUM AND PIPERACILLIN SODIUM 3.38 G: 375; 3 INJECTION, SOLUTION INTRAVENOUS at 00:20

## 2021-08-11 RX ADMIN — ENOXAPARIN SODIUM 40 MG: 40 INJECTION SUBCUTANEOUS at 05:20

## 2021-08-11 RX ADMIN — VENLAFAXINE HYDROCHLORIDE 75 MG: 75 CAPSULE, EXTENDED RELEASE ORAL at 08:04

## 2021-08-11 RX ADMIN — HYDROCHLOROTHIAZIDE 25 MG: 25 TABLET ORAL at 08:04

## 2021-08-11 RX ADMIN — SODIUM CHLORIDE, PRESERVATIVE FREE 10 ML: 5 INJECTION INTRAVENOUS at 08:04

## 2021-08-11 RX ADMIN — TAZOBACTAM SODIUM AND PIPERACILLIN SODIUM 3.38 G: 375; 3 INJECTION, SOLUTION INTRAVENOUS at 07:32

## 2021-08-11 NOTE — PROGRESS NOTES
Discharge Planning Assessment  [facility]     Patient Name: Funmi Batres  MRN: [id number]  Today's Date: 8/11/2021    Admit Date: 8/9/2021    Discharge Needs Assessment     Row Name 08/11/21 1234       Living Environment    Lives With  spouse    Current Living Arrangements  home/apartment/condo    Primary Care Provided by  self    Provides Primary Care For  no one    Family Caregiver if Needed  spouse    Quality of Family Relationships  supportive    Able to Return to Prior Arrangements  yes       Resource/Environmental Concerns    Resource/Environmental Concerns  none    Transportation Concerns  car, none       Transition Planning    Patient/Family Anticipates Transition to  home with family    Patient/Family Anticipated Services at Transition  none    Transportation Anticipated  family or friend will provide       Discharge Needs Assessment    Equipment Currently Used at Home  none    Concerns to be Addressed  discharge planning    Anticipated Changes Related to Illness  none    Equipment Needed After Discharge  none        Discharge Plan     Row Name 08/11/21 1234       Plan    Plan  Home  Pt denies needs    Plan Comments  CCP spoke to patient at bedside to discuss discharge plans.  Pt PCP is Liliana BRYSON.  Pt has no history of HH or rehab stays.  Her Emergency contact is her maribell Juarez [phone].  Plan is home  to transport        Continued Care and Services - Discharged on 8/11/2021 Admission date: 8/9/2021 - Discharge disposition: Home or Self Care   Coordination has not been started for this encounter.       Expected Discharge Date and Time     Expected Discharge Date Expected Discharge Time    Aug 11, 2021         Demographic Summary    No documentation.       Functional Status    No documentation.       Psychosocial    No documentation.       Abuse/Neglect    No documentation.       Legal    No documentation.       Substance Abuse    No documentation.       Patient Forms    No  documentation.           Rut Dye RN

## 2021-08-11 NOTE — DISCHARGE SUMMARY
PHYSICIAN DISCHARGE SUMMARY                                                                        [facility]    Date of admit: 8/9/2021  Date of Discharge:  8/11/2021    PCP: Provider, No Known    Discharge Diagnosis:     Alcoholic intoxication with complication (CMS/HCC)  Acute hypercapnic resp failure  Airway compromise  ETOH intoxication    Secondary Diagnoses:  Past Medical History:   Diagnosis Date   • Anxiety    • Hypertension        Procedures Performed           Consults:       Hospital Course  Patient is a 55 y.o. female presented with per H&P:     CC: altered mental status     History of Present Illness:  Patient is a 55-year-old female  With a previous medical history of allergic rhinitis and hypertension who presented to the emergency room by EMS secondary to altered mental status and respiratory distress.  The patient's  relates history that the patient had been in her normal state of health.  Had gone to a business lunch/dinner celebration.  He subsequently got a call that she would need a ride home secondary to alcohol consumption.  Upon his arrival the patient was able to walk with significant assistance drowsy but responsive.  By the time she got to her 's car she had a episode of emesis passed out but was able to awaken to 's voice however patient lost consciousness and emergency medical services were called.     Upon arrival the patient was not protecting her airway was intubated by ER staff in the ER.  abg showing acute hypercapnic resp failure. CT scan of the head showed no acute abnormality.  Laboratory investigations were reassuring EtOH level 202.  The patient's  however relates that she usually does not drink very much at all.  On social occasion she will have maybe 1 drink at the max.  That this amount of alcohol for her would be significant.     RN reports pt awakened during ct  head and nodded and shook head to command.  Briefly, during my interview, pt did awaken nodded head that she was cold.          Patient was admitted with acute hypercapnic respiratory failure secondary to airway compromise and alcohol intoxication.  Imaging showed evidence of likely aspiration pneumonia and was started on Zosyn.  As the alcohol got out of her system she was able to mentate appropriately and successfully extubated from the ventilator yesterday.  Doing well and ready for discharge.  We will continue a course of antibiotics for aspiration pneumonia.  This appears to have been an accidental overdose of the patient does not normally drink significantly.        Condition on Discharge:  Stable.      Vital Signs  Temp:  [98.2 °F (36.8 °C)-98.7 °F (37.1 °C)] 98.5 °F (36.9 °C)  Heart Rate:  [66-92] 75  Resp:  [14-18] 16  BP: (109-140)/(50-84) 131/74    Physical Exam:  General Appearance: no acute distress, appears comfortable  Heart: regular rate and rhythm  Lungs: clear to auscultation bilaterally, respirations unlabored  Abdomen: soft, nontender, nondistended, no guarding/rebound, nl BS  Extremeties: no edema, no cyanosis  Neurology: speech normal, mental status normal, moving all four extremities  Mental Status: alert, oriented        --  Consults:   IP CONSULT TO PULMONOLOGY    Significant Discharge Diagnostics   Procedures Performed:         Pertinent Lab Results:  Results from last 7 days   Lab Units 08/11/21  0520 08/10/21  0604 08/09/21  2058   SODIUM mmol/L 142 137 139   POTASSIUM mmol/L 3.6 3.3* 3.8   CHLORIDE mmol/L 108* 102 100   CO2 mmol/L 25.4 22.6 24.5   BUN mg/dL 8 11 15   CREATININE mg/dL 0.79 0.63 0.81   GLUCOSE mg/dL 88 92 110*   CALCIUM mg/dL 8.4* 8.3* 9.4   AST (SGOT) U/L 18 21 26   ALT (SGPT) U/L 15 17 21     Results from last 7 days   Lab Units 08/09/21 2058   TROPONIN T ng/mL <0.010     Results from last 7 days   Lab Units 08/11/21  0520 08/10/21  0604 08/10/21  0604 08/09/21  2058  08/09/21 2058   WBC 10*3/mm3 6.05  --  10.47  --  10.97*   HEMOGLOBIN g/dL 11.0*  --  11.5*  --  13.1   HEMATOCRIT % 34.9  --  35.8  --  38.8   PLATELETS 10*3/mm3 239  --  260  --  309   MCV fL 96.7   < > 96.5   < > 94.2   MCH pg 30.5   < > 31.0   < > 31.8   MCHC g/dL 31.5   < > 32.1   < > 33.8   RDW % 12.9   < > 12.7   < > 13.0   RDW-SD fl 46.2   < > 45.6   < > 43.8   MPV fL 11.8   < > 11.9   < > 11.7   NEUTROPHIL % % 48.0   < > 67.1  --   --    LYMPHOCYTE % % 39.5   < > 24.3  --   --    MONOCYTES % % 8.8   < > 7.3  --   --    EOSINOPHIL % % 2.3   < > 0.3  --   --    BASOPHIL % % 1.2   < > 0.6  --   --    IMM GRAN % % 0.2   < > 0.4  --   --    NEUTROS ABS 10*3/mm3 2.91   < > 7.04*  --  4.06   LYMPHS ABS 10*3/mm3 2.39   < > 2.54  --   --    MONOS ABS 10*3/mm3 0.53   < > 0.76  --   --    EOS ABS 10*3/mm3 0.14   < > 0.03  --  0.44*   BASOS ABS 10*3/mm3 0.07   < > 0.06  --   --    IMMATURE GRANS (ABS) 10*3/mm3 0.01   < > 0.04  --   --    NRBC /100 WBC 0.0   < > 0.0   < > 0.0    < > = values in this interval not displayed.         Results from last 7 days   Lab Units 08/09/21 2058   MAGNESIUM mg/dL 2.1           Invalid input(s): LDLCALC          Results from last 7 days   Lab Units 08/10/21  1012   PH, ARTERIAL pH units 7.388   PO2 ART mm Hg 88.2   PCO2, ARTERIAL mm Hg 40.3   HCO3 ART mmol/L 24.3     Results from last 7 days   Lab Units 08/10/21  0605 08/10/21  0151   LACTATE mmol/L 2.9* 2.3*             Results from last 7 days   Lab Units 08/10/21  0210 08/10/21  0152   BLOODCX  No growth at 24 hours No growth at 24 hours     Results from last 7 days   Lab Units 08/09/21  2154   NITRITE UA  Negative               Imaging Results:  Imaging Results (All)     Procedure Component Value Units Date/Time    CT Head Without Contrast [[id number]] Collected: 08/09/21 2252     Updated: 08/09/21 2258    Narrative:      CT HEAD WITHOUT CONTRAST     HISTORY: Encephalopathy     COMPARISON: None available.     TECHNIQUE: Axial CT  imaging was obtained through the brain. No IV  contrast was administered.     FINDINGS:  No acute intracranial hemorrhage is seen. The ventricles are normal in  size. There is no midline shift or mass effect. No calvarial fracture is  seen. Paranasal sinuses and mastoid air cells appear clear.       Impression:      No acute intracranial findings.     Radiation dose reduction techniques were utilized, including automated  exposure control and exposure modulation based on body size.     This report was finalized on 8/9/2021 10:55 PM by Dr. Kenya Black M.D.       XR Chest 1 View [[id number]] Collected: 08/09/21 2131     Updated: 08/09/21 2136    Narrative:      SINGLE VIEW OF THE CHEST     HISTORY: Tube placement     COMPARISON: None available.     FINDINGS:  Endotracheal tube terminates at the thoracic inlet. It could be advanced  approximately to 3 cm. There is cardiomegaly. There is vascular  congestion. No pneumothorax is seen. There is left basilar  consolidation, which may reflect atelectasis or infiltrate, and there  may be a small left pleural effusion. No pneumothorax is seen.       Impression:      Endotracheal tube terminates at the thoracic inlet. It could be advanced  approximately 2 to 3 cm.     This report was finalized on 8/9/2021 9:33 PM by Dr. Kenya Black M.D.           --    Discharge Disposition  Home or Self Care    Discharge Medications     Discharge Medications      New Medications      Instructions Start Date   amoxicillin-clavulanate 875-125 MG per tablet  Commonly known as: Augmentin   1 tablet, Oral, 2 Times Daily         Continue These Medications      Instructions Start Date   fluticasone 50 MCG/ACT nasal spray  Commonly known as: Flonase   2 sprays, Nasal, Daily      hydroCHLOROthiazide 25 MG tablet  Commonly known as: HYDRODIURIL   25 mg, Oral, Daily      venlafaxine XR 75 MG 24 hr capsule  Commonly known as: EFFEXOR-XR   75 mg, Oral, Daily             Discharge Diet:  regular diet    Activity at Discharge:     Follow-up Information     Provider, No Known .    Contact information:  [place] [id number] [phone]                 See primary care provider, Provider, No Known, in 1-2 weeks        Test Results Pending at Discharge  Pending Labs     Order Current Status    Blood Culture - Blood, Arm, Left Preliminary result    Blood Culture - Blood, Arm, Right Preliminary result           Jaspreet Ledbetter MD  [facility], [facility]  Pulmonary and Critical Care Medicine  08/11/21  09:54 EDT    Time: greater than 30 minutes.        Total time spent discharging patient including evaluation, post hospitalization follow up, medication and post hospitalization instructions and education total time exceeds 30 minutes.

## 2021-08-15 LAB
BACTERIA SPEC AEROBE CULT: NORMAL
BACTERIA SPEC AEROBE CULT: NORMAL